# Patient Record
(demographics unavailable — no encounter records)

---

## 2025-01-09 NOTE — PHYSICAL EXAM
[Alert] : alert [No Acute Distress] : no acute distress [Cooperative] : cooperative [Normocephalic] : normocephalic [Conjunctivae with no discharge] : conjunctivae with no discharge [PERRL] : PERRL [EOMI Bilateral] : EOMI bilateral [Pink Nasal Mucosa] : pink nasal mucosa [Uvula Midline] : uvula midline [Nonerythematous Oropharynx] : nonerythematous oropharynx [Supple, full passive range of motion] : supple, full passive range of motion [Clear to Auscultation Bilaterally] : clear to auscultation bilaterally [Regular Rate and Rhythm] : regular rate and rhythm [Normal S1, S2 present] : normal S1, S2 present [No Murmurs] : no murmurs [Soft] : soft [NonTender] : non tender [Non Distended] : non distended [Normoactive Bowel Sounds] : normoactive bowel sounds [Juan: _____] : Juan [unfilled] [Uncircumcised] : uncircumcised [Testicles Descended Bilaterally] : testicles descended bilaterally [No pain or deformities with palpation of bone, muscles, joints] : no pain or deformities with palpation of bone, muscles, joints [Normal Muscle Tone] : normal muscle tone [Straight] : straight [Permanent Teeth Eruption] : permanent teeth eruption [No Scoliosis] : no scoliosis [No Discharge] : no discharge [No Palpable Masses] : no palpable masses [No Abnormal Lymph Nodes Palpated] : no abnormal lymph nodes palpated [FreeTextEntry1] : well-behaved, pleasant [FreeTextEntry3] : absent light reflex but TMs otherwise clear [de-identified] : 3+ tonsillar enlargement [FreeTextEntry7] : breathing comfortably [de-identified] : grossly normal  [de-identified] : 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

## 2025-01-09 NOTE — PHYSICAL EXAM
[Alert] : alert [No Acute Distress] : no acute distress [Cooperative] : cooperative [Normocephalic] : normocephalic [Conjunctivae with no discharge] : conjunctivae with no discharge [PERRL] : PERRL [EOMI Bilateral] : EOMI bilateral [Pink Nasal Mucosa] : pink nasal mucosa [Uvula Midline] : uvula midline [Nonerythematous Oropharynx] : nonerythematous oropharynx [Supple, full passive range of motion] : supple, full passive range of motion [Clear to Auscultation Bilaterally] : clear to auscultation bilaterally [Regular Rate and Rhythm] : regular rate and rhythm [Normal S1, S2 present] : normal S1, S2 present [No Murmurs] : no murmurs [Soft] : soft [NonTender] : non tender [Non Distended] : non distended [Normoactive Bowel Sounds] : normoactive bowel sounds [Juan: _____] : Juan [unfilled] [Uncircumcised] : uncircumcised [Testicles Descended Bilaterally] : testicles descended bilaterally [No pain or deformities with palpation of bone, muscles, joints] : no pain or deformities with palpation of bone, muscles, joints [Normal Muscle Tone] : normal muscle tone [Straight] : straight [Permanent Teeth Eruption] : permanent teeth eruption [No Scoliosis] : no scoliosis [No Discharge] : no discharge [No Palpable Masses] : no palpable masses [No Abnormal Lymph Nodes Palpated] : no abnormal lymph nodes palpated [FreeTextEntry1] : well-behaved, pleasant [FreeTextEntry3] : absent light reflex but TMs otherwise clear [de-identified] : 3+ tonsillar enlargement [FreeTextEntry7] : breathing comfortably [de-identified] : grossly normal  [de-identified] : 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

## 2025-01-09 NOTE — DISCUSSION/SUMMARY
[Normal Development] : development [No Elimination Concerns] : elimination [No Feeding Concerns] : feeding [Normal Sleep Pattern] : sleep [Poor Height Growth] : poor height growth [No Medication Changes] : No medication changes at this time [Mother] : mother [Father] : father [Full Activity without restrictions including Physical Education & Athletics] : Full Activity without restrictions including Physical Education & Athletics [I have examined the above-named student and completed the preparticipation physical evaluation. The athlete does not present apparent clinical contraindications to practice and participate in sport(s) as outlined above. A copy of the physical exam is on r] : I have examined the above-named student and completed the preparticipation physical evaluation. The athlete does not present apparent clinical contraindications to practice and participate in sport(s) as outlined above. A copy of the physical exam is on record in my office and can be made available to the school at the request of the parents. If conditions arise after the athlete has been cleared for participation, the physician may rescind the clearance until the problem is resolved and the potential consequences are completely explained to the athlete (and parents/guardians). [] : The components of the vaccine(s) to be administered today are listed in the plan of care. The disease(s) for which the vaccine(s) are intended to prevent and the risks have been discussed with the caretaker.  The risks are also included in the appropriate vaccination information statements which have been provided to the patient's caregiver.  The caregiver has given consent to vaccinate. [FreeTextEntry1] :  Pleasant 8 year old boy with mild eczema Eczema is well-controlled with sparing use of topical steroid Longstanding hx of recurrent but infrequent emesis (suspected motion sickness), improved  Appropriate weight gain (5 lb) but poor height increase (4 cm) in the last year; BMI remains in healthy range, but height %ile declined over past couple of years Developing appropriately Exam notable for mild tonsillar enlargement and stable CMN  1) Health maintenance - Extensive dietary counseling provided - Routine F/U with dentist - Flu vaccine administered by RN - Return for annual C appt  2) Motion sickness - Trial children's dramamine prior to prolonged car ride - Discussed preventive measures and supportive care  3) Eczema - [place] use of gentle emollients - Sparing use of topical steroid (OTC HC 2.5%) for flares - F/U with Derm PRN (also for CMN)  4) Linear deceleration (possibly constitutional delay vs. familial short stature) - Bone age x-ray ordered to further assess - Consider Peds Endo referral

## 2025-01-09 NOTE — DISCUSSION/SUMMARY
[Normal Development] : development [No Elimination Concerns] : elimination [No Feeding Concerns] : feeding [Normal Sleep Pattern] : sleep [Poor Height Growth] : poor height growth [No Medication Changes] : No medication changes at this time [Mother] : mother [Father] : father [Full Activity without restrictions including Physical Education & Athletics] : Full Activity without restrictions including Physical Education & Athletics [I have examined the above-named student and completed the preparticipation physical evaluation. The athlete does not present apparent clinical contraindications to practice and participate in sport(s) as outlined above. A copy of the physical exam is on r] : I have examined the above-named student and completed the preparticipation physical evaluation. The athlete does not present apparent clinical contraindications to practice and participate in sport(s) as outlined above. A copy of the physical exam is on record in my office and can be made available to the school at the request of the parents. If conditions arise after the athlete has been cleared for participation, the physician may rescind the clearance until the problem is resolved and the potential consequences are completely explained to the athlete (and parents/guardians). [] : The components of the vaccine(s) to be administered today are listed in the plan of care. The disease(s) for which the vaccine(s) are intended to prevent and the risks have been discussed with the caretaker.  The risks are also included in the appropriate vaccination information statements which have been provided to the patient's caregiver.  The caregiver has given consent to vaccinate. [FreeTextEntry1] :  Pleasant 8 year old boy with mild eczema Eczema is well-controlled with sparing use of topical steroid Longstanding hx of recurrent but infrequent emesis (suspected motion sickness), improved  Appropriate weight gain (5 lb) but poor height increase (4 cm) in the last year; BMI remains in healthy range, but height %ile declined over past couple of years Developing appropriately Exam notable for mild tonsillar enlargement and stable CMN  1) Health maintenance - Extensive dietary counseling provided - Routine F/U with dentist - Flu vaccine administered by RN - Return for annual C appt  2) Motion sickness - Trial children's dramamine prior to prolonged car ride - Discussed preventive measures and supportive care  3) Eczema - [place] use of gentle emollients - Sparing use of topical steroid (OTC HC 2.5%) for flares - F/U with Derm PRN (also for CMN)  4) Linear deceleration (possibly constitutional delay vs. familial short stature) - Bone age x-ray ordered to further assess - Consider Peds Endo referral

## 2025-01-09 NOTE — HISTORY OF PRESENT ILLNESS
[whole] : whole milk [Fruit] : fruit [Vegetables] : vegetables [Meat] : meat [Eggs] : eggs [Dairy] : dairy [___ stools per day] : [unfilled]  stools per day [Normal] : Normal [In own bed] : In own bed [Brushing teeth twice/d] : brushing teeth twice per day [Flossing teeth] : flossing teeth [Yes] : Patient goes to dentist yearly [Toothpaste] : Primary Fluoride Source: Toothpaste [Participates in after-school activities] : participates in after-school activities [Appropiate parent-child-sibling interaction] : appropriate parent-child-sibling interaction [Has Friends] : has friends [Grade ___] : Grade [unfilled] [Adequate social interactions] : adequate social interactions [Adequate behavior] : adequate behavior [Adequate attention] : adequate attention [No] : No cigarette smoke exposure [Supervised outdoor play] : supervised outdoor play [Supervised around water] : supervised around water [Parents] : parents [Does chores when asked] : does chores when asked [Appropriately restrained in motor vehicle] : not appropriately restrained in motor vehicle [Wears helmet and pads] : wears helmet and pads [Parent discusses safety rules regarding adults] : parent discusses safety rules regarding adults [Exposure to electronic nicotine delivery system] : No exposure to electronic nicotine delivery system [Up to date] : Up to date [FreeTextEntry7] : UC visit in Dec for low-grade fever and throat redness (and exudate); strep testing negative After resolution of sx, developed recurrent pruritic hives nightly, transient improvement with benadryl  Hives eventually resolved within 2 weeks No new foods or meds prior to onset of hives [de-identified] : Ongoing motion sickness, occurs during prolonged car rides (1+ hour duration) Child denies headache and abd pain Parents have not tried Dramamine [de-identified] : Eats 3 meals, typically healthy/home-cooked food; likes eggs, avocado, nuts [FreeTextEntry9] : Activities include soccer, swimming, snowboarding [de-identified] : Grades are improving. Receives extra help with math and reading during afterschool program, no IEP or school accommodations

## 2025-01-09 NOTE — HISTORY OF PRESENT ILLNESS
[whole] : whole milk [Fruit] : fruit [Vegetables] : vegetables [Meat] : meat [Eggs] : eggs [Dairy] : dairy [___ stools per day] : [unfilled]  stools per day [Normal] : Normal [In own bed] : In own bed [Brushing teeth twice/d] : brushing teeth twice per day [Flossing teeth] : flossing teeth [Yes] : Patient goes to dentist yearly [Toothpaste] : Primary Fluoride Source: Toothpaste [Participates in after-school activities] : participates in after-school activities [Appropiate parent-child-sibling interaction] : appropriate parent-child-sibling interaction [Has Friends] : has friends [Grade ___] : Grade [unfilled] [Adequate social interactions] : adequate social interactions [Adequate behavior] : adequate behavior [Adequate attention] : adequate attention [No] : No cigarette smoke exposure [Supervised outdoor play] : supervised outdoor play [Supervised around water] : supervised around water [Parents] : parents [Does chores when asked] : does chores when asked [Appropriately restrained in motor vehicle] : not appropriately restrained in motor vehicle [Wears helmet and pads] : wears helmet and pads [Parent discusses safety rules regarding adults] : parent discusses safety rules regarding adults [Exposure to electronic nicotine delivery system] : No exposure to electronic nicotine delivery system [Up to date] : Up to date [FreeTextEntry7] : UC visit in Dec for low-grade fever and throat redness (and exudate); strep testing negative After resolution of sx, developed recurrent pruritic hives nightly, transient improvement with benadryl  Hives eventually resolved within 2 weeks No new foods or meds prior to onset of hives [de-identified] : Ongoing motion sickness, occurs during prolonged car rides (1+ hour duration) Child denies headache and abd pain Parents have not tried Dramamine [de-identified] : Eats 3 meals, typically healthy/home-cooked food; likes eggs, avocado, nuts [FreeTextEntry9] : Activities include soccer, swimming, snowboarding [de-identified] : Grades are improving. Receives extra help with math and reading during afterschool program, no IEP or school accommodations